# Patient Record
Sex: FEMALE | Race: WHITE | NOT HISPANIC OR LATINO | URBAN - METROPOLITAN AREA
[De-identification: names, ages, dates, MRNs, and addresses within clinical notes are randomized per-mention and may not be internally consistent; named-entity substitution may affect disease eponyms.]

---

## 2020-09-11 PROBLEM — I10 ESSENTIAL HYPERTENSION: Status: ACTIVE | Noted: 2020-09-11

## 2020-09-11 PROBLEM — E78.5 HYPERLIPIDEMIA: Status: ACTIVE | Noted: 2020-09-11

## 2020-09-11 PROBLEM — R07.9 CHEST PAIN: Status: ACTIVE | Noted: 2020-09-11

## 2020-09-11 PROBLEM — I20.0 UNSTABLE ANGINA PECTORIS: Status: ACTIVE | Noted: 2020-09-11

## 2023-02-09 ENCOUNTER — LAB VISIT (OUTPATIENT)
Dept: LAB | Facility: HOSPITAL | Age: 88
End: 2023-02-09
Attending: INTERNAL MEDICINE
Payer: MEDICARE

## 2023-02-09 ENCOUNTER — OFFICE VISIT (OUTPATIENT)
Dept: CARDIOLOGY | Facility: CLINIC | Age: 88
End: 2023-02-09
Payer: MEDICARE

## 2023-02-09 VITALS
SYSTOLIC BLOOD PRESSURE: 162 MMHG | HEIGHT: 64 IN | HEART RATE: 71 BPM | DIASTOLIC BLOOD PRESSURE: 78 MMHG | BODY MASS INDEX: 16.19 KG/M2 | WEIGHT: 94.81 LBS

## 2023-02-09 DIAGNOSIS — Z00.00 ANNUAL PHYSICAL EXAM: Primary | ICD-10-CM

## 2023-02-09 DIAGNOSIS — I10 ESSENTIAL HYPERTENSION: ICD-10-CM

## 2023-02-09 DIAGNOSIS — H53.2 DIPLOPIA: ICD-10-CM

## 2023-02-09 DIAGNOSIS — Z00.00 ANNUAL PHYSICAL EXAM: ICD-10-CM

## 2023-02-09 DIAGNOSIS — R42 DIZZINESS AND GIDDINESS: ICD-10-CM

## 2023-02-09 DIAGNOSIS — E78.5 HYPERLIPIDEMIA, UNSPECIFIED HYPERLIPIDEMIA TYPE: ICD-10-CM

## 2023-02-09 DIAGNOSIS — I20.0 UNSTABLE ANGINA PECTORIS: ICD-10-CM

## 2023-02-09 DIAGNOSIS — G45.9 TIA (TRANSIENT ISCHEMIC ATTACK): ICD-10-CM

## 2023-02-09 DIAGNOSIS — R07.9 CHEST PAIN, UNSPECIFIED TYPE: ICD-10-CM

## 2023-02-09 DIAGNOSIS — R94.2 ABNORMAL RESULTS OF PULMONARY FUNCTION STUDIES: ICD-10-CM

## 2023-02-09 LAB
ALBUMIN SERPL BCP-MCNC: 4.3 G/DL (ref 3.5–5.2)
ALP SERPL-CCNC: 131 U/L (ref 55–135)
ALT SERPL W/O P-5'-P-CCNC: 15 U/L (ref 10–44)
ANION GAP SERPL CALC-SCNC: 12 MMOL/L (ref 8–16)
AST SERPL-CCNC: 33 U/L (ref 10–40)
BILIRUB SERPL-MCNC: 0.8 MG/DL (ref 0.1–1)
BUN SERPL-MCNC: 15 MG/DL (ref 8–23)
CALCIUM SERPL-MCNC: 10.4 MG/DL (ref 8.7–10.5)
CHLORIDE SERPL-SCNC: 109 MMOL/L (ref 95–110)
CHOLEST SERPL-MCNC: 288 MG/DL (ref 120–199)
CHOLEST/HDLC SERPL: 4.1 {RATIO} (ref 2–5)
CO2 SERPL-SCNC: 19 MMOL/L (ref 23–29)
CREAT SERPL-MCNC: 1.1 MG/DL (ref 0.5–1.4)
ERYTHROCYTE [DISTWIDTH] IN BLOOD BY AUTOMATED COUNT: 12.6 % (ref 11.5–14.5)
EST. GFR  (NO RACE VARIABLE): 48.6 ML/MIN/1.73 M^2
GLUCOSE SERPL-MCNC: 100 MG/DL (ref 70–110)
HCT VFR BLD AUTO: 46.6 % (ref 37–48.5)
HDLC SERPL-MCNC: 70 MG/DL (ref 40–75)
HDLC SERPL: 24.3 % (ref 20–50)
HGB BLD-MCNC: 15.4 G/DL (ref 12–16)
LDLC SERPL CALC-MCNC: 180.8 MG/DL (ref 63–159)
MCH RBC QN AUTO: 33.3 PG (ref 27–31)
MCHC RBC AUTO-ENTMCNC: 33 G/DL (ref 32–36)
MCV RBC AUTO: 101 FL (ref 82–98)
NONHDLC SERPL-MCNC: 218 MG/DL
PLATELET # BLD AUTO: 254 K/UL (ref 150–450)
PMV BLD AUTO: 11.9 FL (ref 9.2–12.9)
POTASSIUM SERPL-SCNC: 5.8 MMOL/L (ref 3.5–5.1)
PROT SERPL-MCNC: 7.5 G/DL (ref 6–8.4)
RBC # BLD AUTO: 4.62 M/UL (ref 4–5.4)
SODIUM SERPL-SCNC: 140 MMOL/L (ref 136–145)
T4 FREE SERPL-MCNC: 0.99 NG/DL (ref 0.71–1.51)
TRIGL SERPL-MCNC: 186 MG/DL (ref 30–150)
TSH SERPL DL<=0.005 MIU/L-ACNC: 2.57 UIU/ML (ref 0.4–4)
WBC # BLD AUTO: 5.81 K/UL (ref 3.9–12.7)

## 2023-02-09 PROCEDURE — 93005 ELECTROCARDIOGRAM TRACING: CPT | Mod: PO

## 2023-02-09 PROCEDURE — 84443 ASSAY THYROID STIM HORMONE: CPT | Performed by: INTERNAL MEDICINE

## 2023-02-09 PROCEDURE — 99999 PR PBB SHADOW E&M-NEW PATIENT-LVL III: CPT | Mod: PBBFAC,,, | Performed by: INTERNAL MEDICINE

## 2023-02-09 PROCEDURE — 93010 EKG 12-LEAD: ICD-10-PCS | Mod: S$GLB,,, | Performed by: INTERNAL MEDICINE

## 2023-02-09 PROCEDURE — 99999 PR PBB SHADOW E&M-NEW PATIENT-LVL III: ICD-10-PCS | Mod: PBBFAC,,, | Performed by: INTERNAL MEDICINE

## 2023-02-09 PROCEDURE — 93010 ELECTROCARDIOGRAM REPORT: CPT | Mod: S$GLB,,, | Performed by: INTERNAL MEDICINE

## 2023-02-09 PROCEDURE — 99204 PR OFFICE/OUTPT VISIT, NEW, LEVL IV, 45-59 MIN: ICD-10-PCS | Mod: S$GLB,,, | Performed by: INTERNAL MEDICINE

## 2023-02-09 PROCEDURE — 99204 OFFICE O/P NEW MOD 45 MIN: CPT | Mod: S$GLB,,, | Performed by: INTERNAL MEDICINE

## 2023-02-09 PROCEDURE — 85027 COMPLETE CBC AUTOMATED: CPT | Performed by: INTERNAL MEDICINE

## 2023-02-09 PROCEDURE — 36415 COLL VENOUS BLD VENIPUNCTURE: CPT | Mod: PO | Performed by: INTERNAL MEDICINE

## 2023-02-09 PROCEDURE — 1126F PR PAIN SEVERITY QUANTIFIED, NO PAIN PRESENT: ICD-10-PCS | Mod: CPTII,S$GLB,, | Performed by: INTERNAL MEDICINE

## 2023-02-09 PROCEDURE — 84439 ASSAY OF FREE THYROXINE: CPT | Performed by: INTERNAL MEDICINE

## 2023-02-09 PROCEDURE — 1159F PR MEDICATION LIST DOCUMENTED IN MEDICAL RECORD: ICD-10-PCS | Mod: CPTII,S$GLB,, | Performed by: INTERNAL MEDICINE

## 2023-02-09 PROCEDURE — 1159F MED LIST DOCD IN RCRD: CPT | Mod: CPTII,S$GLB,, | Performed by: INTERNAL MEDICINE

## 2023-02-09 PROCEDURE — 1126F AMNT PAIN NOTED NONE PRSNT: CPT | Mod: CPTII,S$GLB,, | Performed by: INTERNAL MEDICINE

## 2023-02-09 PROCEDURE — 80061 LIPID PANEL: CPT | Performed by: INTERNAL MEDICINE

## 2023-02-09 PROCEDURE — 80053 COMPREHEN METABOLIC PANEL: CPT | Performed by: INTERNAL MEDICINE

## 2023-02-09 NOTE — PROGRESS NOTES
Subjective:    Patient ID:  Savannah Huizar is a 87 y.o. female patient here for evaluation Establish Care      History of Present Illness:  New patient cardiac evaluation.  Presents today with suspect TIA, no neurologic residual.  Symptoms occurred 02/08/2023.  Symptoms on that date included mild transient aphasia, visual disturbance that was difficult to say was 1 or both eyes.  Resolved within 5 minutes.  No perceived arrhythmia.  No Syncope/presyncope.  Prior history of TIA with negative neurologic and vascular workup a number of years ago.  Medical therapy recommended.  Risk factors include hypertension , dyslipidemia, positive family history  No tobacco use, no diabetes mellitus.  Past history of chest pain, resulting in brief hospitalization September of 2021, left heart catheterization that time with normal coronaries.  EF.  Left ventricular hemodynamics unremarkable.    No chronic kidney disease.  No history DVT PE.  Probable underlying COPD secondhand smoke exposure.  Stable CHAVEZ.  No PND orthopnea.  No edema.  No past documented peripheral arterial disease.    Positive history of CVA, Ca. TIA ..  Lacunar infarct.        Review of patient's allergies indicates:  No Known Allergies    No past medical history on file.  Past Surgical History:   Procedure Laterality Date    JOINT REPLACEMENT Right     hip    LEFT HEART CATHETERIZATION Left 9/11/2020    Procedure: CATHETERIZATION, HEART, LEFT;  Surgeon: Charlie Chavez MD;  Location: [facility] CATH;  Service: Cardiology;  Laterality: Left;     Social History     Tobacco Use    Smoking status: Never   Substance Use Topics    Alcohol use: Yes     Alcohol/week: 14.0 standard drinks     Types: 14 Cans of beer per week     Comment: 2 beers a night. one for dinner and one for sleeping    Drug use: Never        Review of Systems:    As noted in HPI in addition         REVIEW OF SYSTEMS  Review of Systems   Constitutional: Negative for decreased appetite, diaphoresis, night  sweats, weight gain and weight loss.   HENT:  Negative for nosebleeds and odynophagia.         Visual disturbance   Eyes:  Negative for double vision and photophobia.   Cardiovascular:  Negative for chest pain, claudication, cyanosis, dyspnea on exertion, irregular heartbeat, leg swelling, near-syncope, orthopnea, palpitations, paroxysmal nocturnal dyspnea and syncope.   Respiratory:  Negative for cough, hemoptysis, shortness of breath and wheezing.    Hematologic/Lymphatic: Negative for adenopathy.   Skin:  Negative for flushing, skin cancer and suspicious lesions.   Musculoskeletal:  Negative for gout, myalgias and neck pain.   Gastrointestinal:  Negative for abdominal pain, heartburn, hematemesis and hematochezia.   Genitourinary:  Negative for bladder incontinence, hesitancy and nocturia.   Neurological:  Negative for focal weakness, headaches, light-headedness and paresthesias.   Psychiatric/Behavioral:  Negative for memory loss and substance abuse.      Objective:        Vitals:    02/09/23 1241   BP: (!) 162/78   Pulse: 71       Lab Results   Component Value Date    WBC 5.58 09/10/2020    HGB 13.7 09/10/2020    HCT 41.1 09/10/2020     09/10/2020    ALT 21 09/10/2020    AST 34 09/10/2020     09/10/2020    K 3.2 (L) 09/10/2020     09/10/2020    CREATININE 0.92 09/10/2020    BUN 14 09/10/2020    CO2 22 09/10/2020      CARDIOGRAM RESULTS  No results found for this or any previous visit.        CURRENT/PREVIOUS VISIT EKG  Results for orders placed or performed during the hospital encounter of 09/10/20   EKG 12-lead    Collection Time: 09/11/20 12:07 AM    Narrative    Test Reason : R07.9,    Vent. Rate : 072 BPM     Atrial Rate : 072 BPM     P-R Int : 126 ms          QRS Dur : 080 ms      QT Int : 438 ms       P-R-T Axes : 067 021 012 degrees     QTc Int : 479 ms    Normal sinus rhythm  Normal ECG  When compared with ECG of 10-SEP-2020 20:30,  Criteria for Septal infarct are no longer Present  T  wave inversion no longer evident in Inferior leads  Nonspecific T wave abnormality no longer evident in Anterior leads  QT has shortened  Confirmed by Hilario Lopez MD ([id number]) on 9/11/2020 10:41:06 AM    Referred By: [email or web address]   SELF           Confirmed By:Hilario Lopez MD     No valid procedures specified.   Results for orders placed during the hospital encounter of 09/10/20    Nuclear Stress - Cardiology Interpreted    Interpretation Summary    The study shows abnormal myocardial perfusion.    Abnormal myocardial perfusion study.    Perfusion Defect There is a small to moderate sized, reversible defect that is consistent with ischemia in the basal to mid inferoseptal wall(s) in the typical distribution of the RCA territory.    The EKG portion of this study is negative for ischemia.    The patient reported no chest pain during the stress test.    There were no arrhythmias during stress.    EF 79 %    No valid procedures specified.    PHYSICAL EXAM  CONSTITUTIONAL: Well built, well nourished in no apparent distress  NECK: no carotid bruit, no JVD  LUNGS: CTA  CHEST WALL: no tenderness,  HEART: regular rate and rhythm, S1, S2 normal, no murmur, click, rub or gallop   ABDOMEN: soft, non-tender; bowel sounds normal; no masses,  no organomegaly  EXTREMITIES: Extremities normal, no edema, no calf tenderness noted  VASCULAR EXAM:  Absent right radial artery pulse AND +1 LOWER EXT PULSES  NEURO: AAO X 3, NO ACUTE FOCAL OR LATERALIZING FINDINGS    I HAVE REVIEWED :    The vital signs, nurses notes, and all the pertinent radiology and labs.         Current Outpatient Medications   Medication Instructions    aspirin (ECOTRIN) 81 mg, Oral, Daily    atorvastatin (LIPITOR) 10 mg, Oral, Daily    ergocalciferol (ERGOCALCIFEROL) 50,000 Units, Oral, Every 30 days    losartan (COZAAR) 100 mg, Oral, Daily    nitroGLYCERIN (NITROSTAT) 0.4 mg, Sublingual, Every 5 min PRN          Assessment:   Recent TIA.  Past history CVA, abnormal MRI  with lacunar infarct.  No neurologic sequelae.  Dyslipidemia, pre hypertension  COPD , secondhand smoke exposure.  Chest pain syndrome September 2020 with resultant left heart catheterization essentially normal coronaries for age.  EF normal.  Pre cath abnormal nuclear study, inferior defect.        Plan:   Labs, CTA neck.  Pulmonary function study.  Ophthalmologic evaluation.          No follow-ups on file.

## 2023-02-10 RX ORDER — CYANOCOBALAMIN, ISOPROPYL ALCOHOL 1000MCG/ML
1000 KIT INJECTION
Qty: 1 KIT | Refills: 6 | Status: SHIPPED | OUTPATIENT
Start: 2023-02-10

## 2023-02-10 RX ORDER — ATORVASTATIN CALCIUM 10 MG/1
10 TABLET, FILM COATED ORAL DAILY
Qty: 90 TABLET | Refills: 3 | Status: SHIPPED | OUTPATIENT
Start: 2023-02-10 | End: 2024-02-10

## 2023-02-20 ENCOUNTER — HOSPITAL ENCOUNTER (OUTPATIENT)
Dept: RADIOLOGY | Facility: HOSPITAL | Age: 88
Discharge: HOME | End: 2023-02-20
Attending: INTERNAL MEDICINE
Payer: MEDICARE

## 2023-02-20 DIAGNOSIS — R42 DIZZINESS AND GIDDINESS: ICD-10-CM

## 2023-02-20 DIAGNOSIS — H53.2 DIPLOPIA: ICD-10-CM

## 2023-02-20 PROCEDURE — 25500020 PHARM REV CODE 255: Mod: PO | Performed by: INTERNAL MEDICINE

## 2023-02-20 PROCEDURE — 70496 CTA HEAD AND NECK (XPD): ICD-10-PCS | Mod: 26,,, | Performed by: RADIOLOGY

## 2023-02-20 PROCEDURE — 70496 CT ANGIOGRAPHY HEAD: CPT | Mod: 26,,, | Performed by: RADIOLOGY

## 2023-02-20 PROCEDURE — 70496 CT ANGIOGRAPHY HEAD: CPT | Mod: TC,PO

## 2023-02-20 PROCEDURE — 70498 CTA HEAD AND NECK (XPD): ICD-10-PCS | Mod: 26,,, | Performed by: RADIOLOGY

## 2023-02-20 PROCEDURE — 70498 CT ANGIOGRAPHY NECK: CPT | Mod: 26,,, | Performed by: RADIOLOGY

## 2023-02-20 RX ADMIN — IOHEXOL 100 ML: 350 INJECTION, SOLUTION INTRAVENOUS at 04:02

## 2023-02-24 ENCOUNTER — OFFICE VISIT (OUTPATIENT)
Dept: OPTOMETRY | Facility: CLINIC | Age: 88
End: 2023-02-24
Payer: MEDICARE

## 2023-02-24 DIAGNOSIS — Z96.1 PSEUDOPHAKIA: ICD-10-CM

## 2023-02-24 DIAGNOSIS — H52.7 REFRACTIVE ERROR: ICD-10-CM

## 2023-02-24 DIAGNOSIS — H35.371 EPIRETINAL MEMBRANE (ERM) OF RIGHT EYE: ICD-10-CM

## 2023-02-24 DIAGNOSIS — H43.393 VITREOUS FLOATERS OF BOTH EYES: ICD-10-CM

## 2023-02-24 DIAGNOSIS — H04.123 DRY EYE SYNDROME OF BILATERAL LACRIMAL GLANDS: Primary | ICD-10-CM

## 2023-02-24 DIAGNOSIS — Z86.69 HISTORY OF RETINAL DETACHMENT: ICD-10-CM

## 2023-02-24 PROCEDURE — 92134 PR COMPUTERIZED OPHTHALMIC IMAGING RETINA: ICD-10-PCS | Mod: S$GLB,,,

## 2023-02-24 PROCEDURE — 3288F PR FALLS RISK ASSESSMENT DOCUMENTED: ICD-10-PCS | Mod: CPTII,S$GLB,,

## 2023-02-24 PROCEDURE — 3288F FALL RISK ASSESSMENT DOCD: CPT | Mod: CPTII,S$GLB,,

## 2023-02-24 PROCEDURE — 92004 COMPRE OPH EXAM NEW PT 1/>: CPT | Mod: S$GLB,,,

## 2023-02-24 PROCEDURE — 1126F AMNT PAIN NOTED NONE PRSNT: CPT | Mod: CPTII,S$GLB,,

## 2023-02-24 PROCEDURE — 1159F MED LIST DOCD IN RCRD: CPT | Mod: CPTII,S$GLB,,

## 2023-02-24 PROCEDURE — 92004 PR EYE EXAM, NEW PATIENT,COMPREHESV: ICD-10-PCS | Mod: S$GLB,,,

## 2023-02-24 PROCEDURE — 99999 PR PBB SHADOW E&M-EST. PATIENT-LVL II: ICD-10-PCS | Mod: PBBFAC,,,

## 2023-02-24 PROCEDURE — 1126F PR PAIN SEVERITY QUANTIFIED, NO PAIN PRESENT: ICD-10-PCS | Mod: CPTII,S$GLB,,

## 2023-02-24 PROCEDURE — 1101F PT FALLS ASSESS-DOCD LE1/YR: CPT | Mod: CPTII,S$GLB,,

## 2023-02-24 PROCEDURE — 1101F PR PT FALLS ASSESS DOC 0-1 FALLS W/OUT INJ PAST YR: ICD-10-PCS | Mod: CPTII,S$GLB,,

## 2023-02-24 PROCEDURE — 92134 CPTRZ OPH DX IMG PST SGM RTA: CPT | Mod: S$GLB,,,

## 2023-02-24 PROCEDURE — 99999 PR PBB SHADOW E&M-EST. PATIENT-LVL II: CPT | Mod: PBBFAC,,,

## 2023-02-24 PROCEDURE — 1159F PR MEDICATION LIST DOCUMENTED IN MEDICAL RECORD: ICD-10-PCS | Mod: CPTII,S$GLB,,

## 2023-02-24 NOTE — PROGRESS NOTES
HPI    Dle- 3 yrs- Outside Provider    Pt here for routine eye exam. Pt sts changes to distance va, pt does not   wear gls. Denies floaters/eye pain. Flashes, pt notices when eyes are   closed, x1 yr. HX of retinal detachment OD. Soreness OD only, occasional.   No gtts.   Last edited by Evelia Hyman MA on 2/24/2023  8:28 AM.        ROS    Positive for: Eyes  Negative for: Constitutional, Gastrointestinal, Neurological, Skin,   Genitourinary, Musculoskeletal, HENT, Endocrine, Cardiovascular,   Respiratory, Psychiatric, Allergic/Imm, Heme/Lymph  Last edited by Dionicio Reece, OD on 2/24/2023  8:58 AM.        Assessment /Plan     For exam results, see Encounter Report.    Dry eye syndrome of bilateral lacrimal glands    Vitreous floaters of both eyes    Pseudophakia    Epiretinal membrane (ERM) of right eye  -     Posterior Segment OCT Retina-Both eyes    History of retinal detachment    Refractive error      Dryness symptoms OD>OS likely secondary to pterygium and mild ectropion. Recommended OTC Pataday 1x/day prn for relief of itching, artificial tears TID-QID, and gel drop at bedtime (cautioned on blur). Ok to continue Lumify 1-2x/day for redness relief.   Ed pt on floaters and their overall benign nature. Ed on signs and symptoms of a RD and to return asap if experienced.  Stable, open capsule. Monitor yearly for changes.  Moderate/severe ERM contributing to reduced BCVA (20/70). Longstanding per pt, was told spec rx would not improve vision. Very early/mild ERM OS. Continue to monitor yearly for changes with repeat Mac OCT.  H/O RD repair OD several years ago. Stable. No new holes/tears/RD. Reviewed signs and symptoms of RD with pt and ed to RTC asap if experienced. Continue to monitor yearly for changes.  No improvement in acuity with refraction secondary to epiretinal membrane. Recommended OTC readers +2.50-+3.00 for use with near activities. Pt likes to sew, ed to use while sewing to help magnify.    RTC: 1  year for comprehensive exam or sooner prn

## 2023-05-15 PROBLEM — Z00.00 ANNUAL PHYSICAL EXAM: Status: RESOLVED | Noted: 2023-02-09 | Resolved: 2023-05-15

## 2024-03-09 DIAGNOSIS — I10 ESSENTIAL HYPERTENSION: Primary | ICD-10-CM

## 2024-03-11 RX ORDER — ATORVASTATIN CALCIUM 10 MG/1
10 TABLET, FILM COATED ORAL
Qty: 90 TABLET | Refills: 3 | Status: SHIPPED | OUTPATIENT
Start: 2024-03-11